# Patient Record
Sex: FEMALE | Race: ASIAN | NOT HISPANIC OR LATINO | URBAN - METROPOLITAN AREA
[De-identification: names, ages, dates, MRNs, and addresses within clinical notes are randomized per-mention and may not be internally consistent; named-entity substitution may affect disease eponyms.]

---

## 2021-11-20 ENCOUNTER — EMERGENCY (EMERGENCY)
Facility: HOSPITAL | Age: 31
LOS: 1 days | Discharge: HOME | End: 2021-11-20
Attending: STUDENT IN AN ORGANIZED HEALTH CARE EDUCATION/TRAINING PROGRAM | Admitting: STUDENT IN AN ORGANIZED HEALTH CARE EDUCATION/TRAINING PROGRAM
Payer: SELF-PAY

## 2021-11-20 VITALS
HEART RATE: 97 BPM | DIASTOLIC BLOOD PRESSURE: 85 MMHG | OXYGEN SATURATION: 100 % | TEMPERATURE: 98 F | SYSTOLIC BLOOD PRESSURE: 145 MMHG | RESPIRATION RATE: 18 BRPM

## 2021-11-20 PROCEDURE — 99284 EMERGENCY DEPT VISIT MOD MDM: CPT

## 2021-11-20 RX ORDER — SODIUM CHLORIDE 9 MG/ML
1000 INJECTION INTRAMUSCULAR; INTRAVENOUS; SUBCUTANEOUS ONCE
Refills: 0 | Status: COMPLETED | OUTPATIENT
Start: 2021-11-20 | End: 2021-11-20

## 2021-11-20 RX ORDER — KETOROLAC TROMETHAMINE 30 MG/ML
15 SYRINGE (ML) INJECTION ONCE
Refills: 0 | Status: DISCONTINUED | OUTPATIENT
Start: 2021-11-20 | End: 2021-11-20

## 2021-11-20 RX ORDER — METOCLOPRAMIDE HCL 10 MG
10 TABLET ORAL ONCE
Refills: 0 | Status: COMPLETED | OUTPATIENT
Start: 2021-11-20 | End: 2021-11-20

## 2021-11-20 RX ADMIN — Medication 15 MILLIGRAM(S): at 23:17

## 2021-11-20 RX ADMIN — Medication 10 MILLIGRAM(S): at 23:17

## 2021-11-20 RX ADMIN — SODIUM CHLORIDE 1000 MILLILITER(S): 9 INJECTION INTRAMUSCULAR; INTRAVENOUS; SUBCUTANEOUS at 23:17

## 2021-11-20 NOTE — ED ADULT NURSE REASSESSMENT NOTE - NS ED NURSE REASSESS COMMENT FT1
pt received, A&Ox3, ambulatory presenting with headache to right side of head eye and face. Pt denies numbness or vision changes, speaking in full sentences and endorses nausea w/o vomiting. Pt denies any further medical history. Pending medications and fluids, will continue to monitor

## 2021-11-20 NOTE — ED ADULT TRIAGE NOTE - CHIEF COMPLAINT QUOTE
Pt c/o HA x1.5 weeks. Denies fever, congestion, weakness, vision changes. Reports "took amoxicillin at home to see if it would help". PMHX childhood asthma. No distress.

## 2021-11-20 NOTE — ED ADULT TRIAGE NOTE - NS ED NURSE BANDS TYPE
Chief Complaint   Patient presents with     Leg Pain       Medication Reconciliation: complete  Yuliya Aguirre LPN  ..................7/1/2019   8:20 AM    Name band;

## 2021-11-21 VITALS
OXYGEN SATURATION: 100 % | TEMPERATURE: 98 F | DIASTOLIC BLOOD PRESSURE: 76 MMHG | SYSTOLIC BLOOD PRESSURE: 117 MMHG | HEART RATE: 85 BPM | RESPIRATION RATE: 18 BRPM

## 2021-11-21 PROCEDURE — 70450 CT HEAD/BRAIN W/O DYE: CPT | Mod: 26,MA

## 2021-11-21 NOTE — ED PROVIDER NOTE - NS ED ROS FT
CONSTITUTIONAL: No fevers, no chills, no lightheadedness, no dizziness  EYES: no visual changes, no eye pain  EARS: no ear drainage, no ear pain, no change in hearing  NOSE: no nasal congestion  MOUTH/THROAT: no sore throat  CV: No chest pain, no palpitations  RESP: No SOB, no cough  GI: No n/v/d, no abd pain  : no dysuria, no hematuria, no flank pain  MSK: no back pain, no extremity pain  SKIN: no rashes  NEURO: no focal weakness, no decreased sensation/paresthesias   PSYCHIATRIC: no known mental health issues.

## 2021-11-21 NOTE — ED PROVIDER NOTE - PATIENT PORTAL LINK FT
You can access the FollowMyHealth Patient Portal offered by [facility] by registering at the following website: [email or web address]. By joining [employer]’s FollowMyHealth portal, you will also be able to view your health information using other applications (apps) compatible with our system.

## 2021-11-21 NOTE — ED ADULT NURSE REASSESSMENT NOTE - NS ED NURSE REASSESS COMMENT FT1
Discharge order placed for patient, Instructions given to patient by MD. BAUER. Alert and oriented x 4, ambulates in steady gait. Patient left ED in stable condition

## 2021-11-21 NOTE — ED PROVIDER NOTE - PHYSICAL EXAMINATION
VITALS: Initial triage and subsequent vitals have been reviewed by me.  Gen: Well appearing, NAD, alert, non-toxic  Head: NCAT  HEENT: PERRL, MMM, normal conjunctiva, anicteric, neck supple, TM clear b/l  Lung: CTAB, no adventitious sounds  CV: RRR, no murmurs, 2+symmetric peripheral pulses  Abd: soft, NTND, no rebound or guarding, no palpable masses  MSK: No edema, no visible deformities  Neuro: Following commands appropriately, fluid speech, CN II-XII grossly intact. 5/5 strength and normal sensation in all extremities. Ambulatory with stable gait.  Skin: Warm and dry, no evidence of rash  Psych: normal mood and affect

## 2021-11-21 NOTE — ED PROVIDER NOTE - CLINICAL SUMMARY MEDICAL DECISION MAKING FREE TEXT BOX
well appearing persistent HA x 1.5 weeks. Well appearing and benign exam however given length of symptoms will get CTH to eval and sx control well appearing persistent HA x 1.5 weeks. Well appearing and benign exam however given length of symptoms will get CTH to eval and sx control, given intermittent nature and no persistent location and benign exam/well appearing low suspicion mass and do not suspect aneurysm/ICH

## 2021-11-21 NOTE — ED PROVIDER NOTE - OBJECTIVE STATEMENT
30yo F no pmx p/w gradual itnerittent R sided temporal HA x 1.5 weeks that occasionally switches locations. No fever, chills, neck pain/stiffness, vision changes, focal weakness/numbness, n/v, cp, sob. No hx of similar HA. Not on OCPs. 30yo F no pmx p/w gradual itnerittent R sided temporal HA x 1.5 weeks that occasionally switches locations w/ intermittent ear ringing. No fever, chills, neck pain/stiffness, vision changes, focal weakness/numbness, n/v, cp, sob. No hx of similar HA. Not on OCPs.

## 2021-11-21 NOTE — ED PROVIDER NOTE - NSFOLLOWUPINSTRUCTIONS_ED_ALL_ED_FT
No signs of emergency medical condition on today's workup.  Presumptive diagnosis made, but further evaluation may be required by your primary care doctor or specialist for a definitive diagnosis.  Therefore, follow up as directed and if symptoms change/worsen or any emergency conditions, please return to the ER.     Follow up with a neurologist as discussed - call to make an appointment    For any new/worsening symptoms such as sudden worsening of headache, nausea/vomiting, vision changes, weakness/numbness, or any other concern return to the ED immediately

## 2022-06-29 PROBLEM — Z00.00 ENCOUNTER FOR PREVENTIVE HEALTH EXAMINATION: Status: ACTIVE | Noted: 2022-06-29

## 2023-02-22 NOTE — ED PROVIDER NOTE - NS ED MD DISPO DISCHARGE CCDA
What to Expect Post Anesthesia    Rest and take it easy for the first 24 hours.  A responsible adult is recommended to remain with you during that time.  It is normal to feel sleepy.  We encourage you to not do anything that requires balance, judgment or coordination.    FOR 24 HOURS DO NOT:  Drive, operate machinery or run household appliances.  Drink beer or alcoholic beverages.  Make important decisions or sign legal documents.    To avoid nausea, slowly advance diet as tolerated, avoiding spicy or greasy foods for the first day.  Add more substantial food to your diet according to your provider's instructions.  Babies can be fed formula or breast milk as soon as they are hungry.  INCREASE FLUIDS AND FIBER TO AVOID CONSTIPATION.    MILD FLU-LIKE SYMPTOMS ARE NORMAL.  YOU MAY EXPERIENCE GENERALIZED MUSCLE ACHES, THROAT IRRITATION, HEADACHE AND/OR SOME NAUSEA.    If any questions arise, call your provider.  If your provider is not available, please feel free to call the Surgical Center at [phone].    MEDICATIONS: Resume taking daily medication.  Take prescribed pain medication with food.  If no medication is prescribed, you may take non-aspirin pain medication if needed.  PAIN MEDICATION CAN BE VERY CONSTIPATING.  Take a stool softener or laxative such as senokot, pericolace, or milk of magnesia if needed.    Last pain medication (Oxycodone 5 mg) given at 12:45 pm      Peripheral Nerve Block Discharge Instructions from Same Day Surgery and Inpatient :    What to Expect - Lower Extremity  The block may cause you to experience numbness and weakness in your thigh and knee or calf and foot on the same side as your surgery  Numbness, tingling and / or weakness are all normal. For some people, this may be an unpleasant sensation  These issues will be resolved when the local anesthetic wears off   You may experience numbness and tingling in your thigh on the same side as your surgery if the block medicine was  "injected at your groin area  Numbness will make it difficult to walk  You may have problems with balance and walking so be very careful   Follow your surgeon's direction regarding weight bearing on your surgical limb  Be very careful with your numb limb  Precautions  The numbness may affect your balance  Be careful when walking or moving around  Your leg may be weak: be very careful putting weight on it  If your surgeon did not specify a time, you should not bear weight for 24 hours  Be sure to ask for help when you need it  It is better to have help than to fall and hurt yourself  Prevent Injury  Protect the limb like a baby  Beware of exposing your limb to extreme heat or cold or trauma  The limb may be injured without you noticing because it is numb  Keep the limb elevated whenever possible  Do not sleep on the limb  Change the position of the limb regularly  Avoid putting pressure on your surgical limb  Pain Control  The initial block on the day of surgery will make your extremity feel \"numb\"  Any consecutive injection including prior to discharge from the hospital will make your extremity feel \"numb\"  You may feel an aching or burning when the local anesthesia starts to wear off  Take pain pills as prescribed by your surgeon  Call your surgeon or anesthesiologist if you do not have adequate pain control    " Patient/Caregiver provided printed discharge information.

## 2023-10-23 ENCOUNTER — EMERGENCY (EMERGENCY)
Facility: HOSPITAL | Age: 33
LOS: 1 days | Discharge: HOME | End: 2023-10-23
Attending: EMERGENCY MEDICINE
Payer: SELF-PAY

## 2023-10-23 VITALS
TEMPERATURE: 98 F | WEIGHT: 199.96 LBS | RESPIRATION RATE: 18 BRPM | SYSTOLIC BLOOD PRESSURE: 126 MMHG | DIASTOLIC BLOOD PRESSURE: 92 MMHG | OXYGEN SATURATION: 100 % | HEART RATE: 98 BPM

## 2023-10-23 PROCEDURE — 99284 EMERGENCY DEPT VISIT MOD MDM: CPT

## 2023-10-23 PROCEDURE — 99283 EMERGENCY DEPT VISIT LOW MDM: CPT

## 2023-10-23 RX ADMIN — Medication 1 TABLET(S): at 22:34

## 2023-10-23 NOTE — ED PROVIDER NOTE - NSFOLLOWUPINSTRUCTIONS_ED_ALL_ED_FT
YOU WERE SEEN IN THE ED FOR: Cat bite    PLEASE WATCH THE CAT BITE FOR ANY SIGNS AND SYMPTOMS SUCH AS ACTING ALTERED, CONFUSED, LETHARGIC, SEIZURES AND RETURN TO THE EMERGENCY DEPARTMENT IMMEDIATELY.     YOU WERE PRESCRIBED: Augmentin  FOLLOW THE INSTRUCTIONS ON THE LABEL/CONTAINER    FOR PAIN/FEVER, YOU MAY TAKE TYLENOL (acetaminophen) AND/OR IBUPROFEN (advil or motrin). FOLLOW THE INSTRUCTIONS ON THE LABEL/CONTAINER.    PLEASE FOLLOW UP WITH YOUR PRIVATE PHYSICIAN WITHIN THE NEXT 48 HOURS. BRING COPIES OF YOUR RESULTS.    RETURN TO THE EMERGENCY DEPARTMENT IF YOU EXPERIENCE ANY NEW/CONCERNING/WORSENING SYMPTOMS SUCH AS BUT NOT LIMITED TO: finger pain, redness or swelling traveling down the finger, inability to move the finger joint, fever, drainage from the wound, or any other concerns.

## 2023-10-23 NOTE — ED PROVIDER NOTE - OBJECTIVE STATEMENT
33 year old female with no pmhx presents to the ED with left third digit wound s/p cat bite. Patient states she was playing with an outdoor kitten and was bitten on the finger. She then rinsed it with water and alcohol. Denies pain, fever, chills. The kittens belong to a friend.

## 2023-10-23 NOTE — ED PROVIDER NOTE - PHYSICAL EXAMINATION
GEN: NAD, awake, eyes open spontaneously  EYES: normal conjunctiva, perrl  ENT: NCAT, MMM, Trachea midline  CHEST/LUNGS: Non-tachypneic, CTAB, bilateral breath sounds  CARDIAC: Non-tachycardic, normal perfusion  ABDOMEN: Soft, NTND, No rebound/guarding  MSK: No edema, no gross deformity of extremities.   SKIN: 2 0.5cm puncture wounds to left third digit distal to DIP joint. No active bleeding. No drainage. No tenderness over wounds or DIP joint, full ROM, n/v intact. No rashes, no petechiae, no vesicles  NEURO: CN grossly intact, normal coordination, no focal motor or sensory deficits  PSYCH: Alert, appropriate, cooperative, with capacity and insight

## 2023-10-23 NOTE — ED PROVIDER NOTE - PATIENT PORTAL LINK FT
You can access the FollowMyHealth Patient Portal offered by [facility] by registering at the following website: [email or web address]. By joining "[email or web address]’s FollowMyHealth portal, you will also be able to view your health information using other applications (apps) compatible with our system.

## 2023-10-23 NOTE — ED ADULT NURSE NOTE - OBJECTIVE STATEMENT
34 yo female AAOX3 presents to ED s/p cat bite. Pt reports being bitten by a friends kitten to the third digit on left hand, pt cleaned bite with water and alcohol after. Minimal swelling noted, no bleeding noted. Pt denies fevers/chills, CP, SOB, weakness. Safety maintained.

## 2023-10-23 NOTE — ED PROVIDER NOTE - CLINICAL SUMMARY MEDICAL DECISION MAKING FREE TEXT BOX
33 year old female presents after cat bite to left third digit. Superficial puncture wounds with no joint involvement. Patient is able to watch the cat for 1 week therefore will not prophylax against rabies at this time. Instructed patient to return if they cannot watch the cat or if the cat develops symptoms. Will give a dose of Augmentin in the ED and d/c home with 7 days. Return precautions discussed. 33 year old female presents after cat bite to left third digit. Superficial puncture wounds with no joint involvement. Patient is able to watch the cat for 10 days per CDC recommendations will not prophylax against rabies at this time. Instructed patient to return if they cannot watch the cat or if the cat develops symptoms. Will give a dose of Augmentin in the ED and d/c home with 7 days. Return precautions discussed.

## 2024-08-02 ENCOUNTER — APPOINTMENT (OUTPATIENT)
Age: 34
End: 2024-08-02
Payer: COMMERCIAL

## 2024-08-02 VITALS
HEART RATE: 87 BPM | HEIGHT: 60 IN | OXYGEN SATURATION: 100 % | SYSTOLIC BLOOD PRESSURE: 132 MMHG | DIASTOLIC BLOOD PRESSURE: 77 MMHG | WEIGHT: 205 LBS | BODY MASS INDEX: 40.25 KG/M2

## 2024-08-02 DIAGNOSIS — M54.12 RADICULOPATHY, CERVICAL REGION: ICD-10-CM

## 2024-08-02 PROCEDURE — 99203 OFFICE O/P NEW LOW 30 MIN: CPT

## 2024-08-02 RX ORDER — DICLOFENAC SODIUM 50 MG/1
50 TABLET, DELAYED RELEASE ORAL
Qty: 28 | Refills: 2 | Status: ACTIVE | COMMUNITY
Start: 2024-08-02 | End: 1900-01-01

## 2024-08-02 NOTE — DISCUSSION/SUMMARY
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  Presentation consistent with right cervical radiculopathy.  Plan 1. Prescription provided for physical therapy 2. Recommend to continue over the counter NSAIDs/Tylenol PRN 3. RTC in 2-3 month.  If symptoms persistent can consider imaging
2

## 2024-08-02 NOTE — HISTORY OF PRESENT ILLNESS
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  Pain begins in the neck and radiate down the right arm to include all the of the fingers.   It began 1/2024  without injury or trauma and has been worsening over the last few months.  Pain is described as numbness/tingling and worse at night.  She has had no further work-up

## 2024-08-02 NOTE — REVIEW OF SYSTEMS
[Negative] : Heme/Lymph [[id number]] : Cervical pain.  See HPI [de-identified] : Numbness/tingling right UE

## 2024-08-02 NOTE — PHYSICAL EXAM
[de-identified] :   Cervical Physical Exam   Inspection: normal   Tenderness: none   Neck ROM Flexion: normal Extension: normal Lateral bending: normal Rotation: normal   Strength testing: Motor strength is 5/5   Neurological exam: Deep tendon reflexes are symmetrical throughout the upper extremities. Sensation is normal.   No clonus Spurlings: positive Hoffmans: Negative